# Patient Record
Sex: FEMALE | Race: WHITE | NOT HISPANIC OR LATINO | Employment: OTHER | URBAN - METROPOLITAN AREA
[De-identification: names, ages, dates, MRNs, and addresses within clinical notes are randomized per-mention and may not be internally consistent; named-entity substitution may affect disease eponyms.]

---

## 2023-05-31 LAB
ABO GROUP (TYPE) IN BLOOD: NORMAL
ABO GROUP (TYPE) IN BLOOD: NORMAL
ANTIBODY SCREEN: NORMAL
ANTIBODY SCREEN: NORMAL
ERYTHROCYTE DISTRIBUTION WIDTH (RATIO) BY AUTOMATED COUNT: 14.4 % (ref 11.5–14.5)
ERYTHROCYTE DISTRIBUTION WIDTH (RATIO) BY AUTOMATED COUNT: 14.4 % (ref 11.5–14.5)
ERYTHROCYTE MEAN CORPUSCULAR HEMOGLOBIN CONCENTRATION (G/DL) BY AUTOMATED: 32.2 G/DL (ref 32–36)
ERYTHROCYTE MEAN CORPUSCULAR HEMOGLOBIN CONCENTRATION (G/DL) BY AUTOMATED: 32.2 G/DL (ref 32–36)
ERYTHROCYTE MEAN CORPUSCULAR VOLUME (FL) BY AUTOMATED COUNT: 86 FL (ref 80–100)
ERYTHROCYTE MEAN CORPUSCULAR VOLUME (FL) BY AUTOMATED COUNT: 86 FL (ref 80–100)
ERYTHROCYTES (10*6/UL) IN BLOOD BY AUTOMATED COUNT: 4.94 X10E12/L (ref 4–5.2)
ERYTHROCYTES (10*6/UL) IN BLOOD BY AUTOMATED COUNT: 4.94 X10E12/L (ref 4–5.2)
HEMATOCRIT (%) IN BLOOD BY AUTOMATED COUNT: 42.5 % (ref 36–46)
HEMATOCRIT (%) IN BLOOD BY AUTOMATED COUNT: 42.5 % (ref 36–46)
HEMOGLOBIN (G/DL) IN BLOOD: 13.7 G/DL (ref 12–16)
HEMOGLOBIN (G/DL) IN BLOOD: 13.7 G/DL (ref 12–16)
LEUKOCYTES (10*3/UL) IN BLOOD BY AUTOMATED COUNT: 5.2 X10E9/L (ref 4.4–11.3)
LEUKOCYTES (10*3/UL) IN BLOOD BY AUTOMATED COUNT: 5.2 X10E9/L (ref 4.4–11.3)
PLATELETS (10*3/UL) IN BLOOD AUTOMATED COUNT: 263 X10E9/L (ref 150–450)
PLATELETS (10*3/UL) IN BLOOD AUTOMATED COUNT: 263 X10E9/L (ref 150–450)
RH FACTOR: NORMAL
RH FACTOR: NORMAL

## 2023-06-02 ENCOUNTER — HOSPITAL ENCOUNTER (OUTPATIENT)
Dept: DATA CONVERSION | Facility: HOSPITAL | Age: 38
End: 2023-06-02
Attending: STUDENT IN AN ORGANIZED HEALTH CARE EDUCATION/TRAINING PROGRAM | Admitting: STUDENT IN AN ORGANIZED HEALTH CARE EDUCATION/TRAINING PROGRAM
Payer: COMMERCIAL

## 2023-06-02 DIAGNOSIS — N93.9 ABNORMAL UTERINE AND VAGINAL BLEEDING, UNSPECIFIED: ICD-10-CM

## 2023-06-02 DIAGNOSIS — N84.0 POLYP OF CORPUS UTERI: ICD-10-CM

## 2023-06-02 DIAGNOSIS — Z88.2 ALLERGY STATUS TO SULFONAMIDES: ICD-10-CM

## 2023-06-10 LAB
COMPLETE PATHOLOGY REPORT: NORMAL
CONVERTED CLINICAL DIAGNOSIS-HISTORY: NORMAL
CONVERTED FINAL DIAGNOSIS: NORMAL
CONVERTED FINAL REPORT PDF LINK TO COPY AND PASTE: NORMAL
CONVERTED GROSS DESCRIPTION: NORMAL
URINE CULTURE: NORMAL

## 2023-09-07 VITALS — WEIGHT: 136.91 LBS | HEIGHT: 67 IN | BODY MASS INDEX: 21.49 KG/M2

## 2023-09-30 NOTE — H&P
History of Present Illness:   Pregnant/Lactating:  ·  Are You Pregnant no   ·  Are You Currently Breastfeeding no     History Present Illness:  Reason for surgery: 37yo P1 with AUB presents for  hysteroscopy, d&c, possible polypectomy, and LNG IUD placement   HPI:    37yo P1 with AUB presents for hysteroscopy, d&c, possible polypectomy, and LNG IUD placement    : Hgb 13.7, Plt 263    Always had irregular menses. Long in induration, 7-11 days. Has bleeding every month that she believes is her period. 10-11 maxi pad, 100% saturated. Has intermenstrual bleeding. For spotting using super tampon every 4-6 hours. Uses double protection.  Painful, especially since delivery. Associated N/V. Hot baths and ibuprofen aids, but does not relieve the pain. No painful intercourse.     Did have a hysteroscopy, D&C and was told that she had polyp ~ 2 years ago at . No interest in future fertility.     TVUS  The uterus measures 10.2 x 4.2 x 5.8 cm. Intramural leiomyoma in the  posterior fundus measures 1.9 cm. There is a subserosal leiomyoma in  the posterior body-fundus junction measuring 2.2 cm.    OBHx: , CS x1   GynHx: Menarche at age 15, as above; last pap 2019 wnl per pt, remote h/o abnormal pap  MedHx: none  SurgHx: laparoscopic right ovarian cystectomy (size of grapefruit) c/b staph infection and hospitalization for 1 month, D&C, CS  Meds: [place]  All: cipro, sulfa, doxy (N/V, hives), shellfish  Social: social alcohol use, vapes; single mom  Family: reviewed, noncontributory          Allergies:        Allergies:  ·  Cipro : Hives/Urticaria  ·  doxycycline : Hives/Urticaria  ·  Bactrim : Unknown    Home Medication Review:   Home Medications Reviewed: yes     Impression/Procedure:   ·  Impression and Planned Procedure: 37yo P1 with AUB presents for hysteroscopy, d&c, possible polypectomy, and LNG IUD placement       ERAS (Enhanced Recovery After Surgery):  ·  ERAS Patient: yes   ·  CPM/PAT Utilization: no   ·   Immunonutrition Recovery Drink Utilization: no   ·  Carbohydrate Supplement Drink Utilization: no     Review of Systems:   Review of Systems:  All Other Systems: All other systems reviewed and  are negative       Physical Exam by System:    Constitutional: NAD   Eyes: EOMI   ENMT: mucous membranes moist   Head/Neck: NCAT   Respiratory/Thorax: normal work of breathing   Cardiovascular: warm and well perfused   Gastrointestinal: abdomen soft   Musculoskeletal: normal ROM   Extremities: no edema appreciated   Neurological: no gross deficits   Psychological: normal mood and affect   Skin: Warm and dry     Consent:   COVID-19 Consent:  ·  COVID-19 Risk Consent Surgeon has reviewed key risks related to the risk of liza COVID-19 and if they contract COVID-19 what the risks are.     Attestation:   Note Completion:  I am a:  Resident/Fellow   Attending Attestation I saw and evaluated the patient.  I personally obtained the key and critical portions of the history and physical exam or was physically present for key and  critical portions performed by the resident/fellow. I reviewed the resident/fellow?s documentation and discussed the patient with the resident/fellow.  I agree with the resident/fellow?s medical decision making as documented in the note.     I personally evaluated the patient on 02-Jun-2023         Electronic Signatures:  Monica Romero)  (Signed 02-Jun-2023 12:19)   Authored: Note Completion   Co-Signer: History of Present Illness, Allergies, Home Medication Review, Impression/Procedure, ERAS, Review of Systems, Physical Exam,  Consent, Note Completion  Oneyda Sher (Resident))  (Signed 01-Jun-2023 16:40)   Authored: History of Present Illness, Allergies, Home  Medication Review, Impression/Procedure, ERAS, Review of Systems, Physical Exam, Consent, Note Completion      Last Updated: 02-Jun-2023 12:19 by Monica Romero)

## 2023-10-02 NOTE — OP NOTE
Post Operative Note:     PreOp Diagnosis: AUB   Post-Procedure Diagnosis: AUB, endometrial polyp   Procedure: 1. Hysteroscopy with resection of endometrial  polyp and tissue  2. IUD placement   Surgeon: Nick   Resident/Fellow/Other Assistant: Alvin Sher   Anesthesia: General LMA   I.V. Fluids: 300cc crystalloid   Estimated Blood Loss (mL): 5   Blood Replacement: none   Specimen: yes   Complications: none   Findings: Tortuous but normal appearing cervical  canal; endometrial cavity with thickening and fluffy endometrial tissue in posterior R wall and small endometrial polyp in lower L side   Patient Returned To/Condition: PACU, stable condition   Urine Output: 300   Drains and/or Catheters: none   Tourniquet Times: n/a   Implants: none     Operative Report Dictated:  Dictation: not applicable - note contains Operative  Report   Operative Report:    After informed consent was obtained, the patient was taken to the operating room where anesthesia was administered. She was then placed in the dorsal lithotomy position.  An exam under anesthesia revealed an anteverted uterus. She was then prepped and draped in the usual sterile fashion. Speculum was inserted to visualize the cervix. A single tooth tenaculum was used to grasp the anterior lip of the cervix. Iraheta dilators  were used to dilate up to a 17 Latvian. Hysteroscope was introduced atraumatically into cervix and then uterus. The endocervix was evaluated for lesions, tubal ostia were then identified, and the uterine cavity was then evaluated for lesions. The cervical  canal was noted to be tortuous but normal appearing. The endometrial cavity was noted to have thickened and fluffy endometrial tissue on the posterior right wall and a small endometrial polyp on lower left side. An aveta smol resector was used to resect  these abnormal areas. Global sampling was also obtained. The scope and resector were then removed. Fluid deficit was noted to be 230 at the  end of the procedure.    Following hysteroscopy, a mirena IUD was placed in the usual fashion. Then the tenaculum was removed and site was hemostatic with minimal pressure.  All counts were correct, the patient tolerated the procedure well.  Dr. Romero was present for the entire  procedure. The patient was taken to PACU in stable condition.    Attestation:   Note Completion:  I am a: Resident/Fellow   Attending Attestation I was present for the entire procedure          Electronic Signatures:  Monica Romero (MD)  (Signed 04-Jun-2023 20:07)   Authored: Note Completion   Co-Signer: Post Operative Note, Note Completion  Oneyda Sher (Resident))  (Signed 02-Jun-2023 15:59)   Authored: Post Operative Note, Note Completion      Last Updated: 04-Jun-2023 20:07 by Monica Romero)

## 2024-01-17 ENCOUNTER — TELEPHONE (OUTPATIENT)
Dept: OBSTETRICS AND GYNECOLOGY | Facility: CLINIC | Age: 39
End: 2024-01-17
Payer: COMMERCIAL

## 2024-01-17 NOTE — TELEPHONE ENCOUNTER
Patient would like to see Dr. Romero to possibly get IUD out and discuss going back on medication. Patient scheduled for 2/6/2024

## 2024-02-06 ENCOUNTER — OFFICE VISIT (OUTPATIENT)
Dept: OBSTETRICS AND GYNECOLOGY | Facility: CLINIC | Age: 39
End: 2024-02-06
Payer: COMMERCIAL

## 2024-02-06 VITALS
SYSTOLIC BLOOD PRESSURE: 122 MMHG | DIASTOLIC BLOOD PRESSURE: 79 MMHG | BODY MASS INDEX: 22.35 KG/M2 | HEART RATE: 100 BPM | WEIGHT: 142.4 LBS | HEIGHT: 67 IN

## 2024-02-06 DIAGNOSIS — N94.89 MENSTRUAL SUPPRESSION: Primary | ICD-10-CM

## 2024-02-06 DIAGNOSIS — R10.84 GENERALIZED ABDOMINAL PAIN: ICD-10-CM

## 2024-02-06 PROCEDURE — 99214 OFFICE O/P EST MOD 30 MIN: CPT | Performed by: STUDENT IN AN ORGANIZED HEALTH CARE EDUCATION/TRAINING PROGRAM

## 2024-02-06 ASSESSMENT — ENCOUNTER SYMPTOMS
GASTROINTESTINAL NEGATIVE: 1
CARDIOVASCULAR NEGATIVE: 0
PSYCHIATRIC NEGATIVE: 0
HEMATOLOGIC/LYMPHATIC NEGATIVE: 0
RESPIRATORY NEGATIVE: 0
CONSTITUTIONAL NEGATIVE: 0
NEUROLOGICAL NEGATIVE: 0
EYES NEGATIVE: 0
ALLERGIC/IMMUNOLOGIC NEGATIVE: 0
MUSCULOSKELETAL NEGATIVE: 0
ENDOCRINE NEGATIVE: 0

## 2024-02-06 ASSESSMENT — PAIN SCALES - GENERAL: PAINLEVEL: 1

## 2024-02-06 NOTE — PROGRESS NOTES
"Division of Minimally Invasive Gynecologic Surgery  [facility]    02/06/24 Gynecology Visit    CC: Follow up AUB    Lucero Snyder is a 38 y.o. w/ Mirena IUD in place (placed 6/2023) presents to discuss persistent AUB on Mirena IUD    Underwent hysteroscopic polypectomy/endometrial sampling/IUD placement on 6/2023.     Prior to procedure, she was having significant abdominal pain. She has since moved to a plant-based diet and this issue has greatly improved.     Her bleeding however has worsened. She is now having menstrual cycles that last up to 3 weeks per month with heavy bleeding. Denies symptoms of anemia.     She would like to have Mirena removed and switch to alternate method.     PMHx, PSHx, SHx, Allergies, and Medications updated in Epic.    ROS: reviewed and negative    PE: /79   Pulse 100   Ht 1.702 m (5' 7\")   Wt 64.6 kg (142 lb 6.4 oz)   LMP 01/17/2024 (Exact Date)   BMI 22.30 kg/m²     Constitutional:  No acute distress, well-nourished and well-developed  HEENT: EOM grossly intact, MMM, neck supple and with full ROM  Pulm:  Effort normal. No accessory muscle usage.  No respiratory distress.  :  - EGBUS: grossly WNL  - Speculum: vaginal and cervical mucosa grossly WNL, no trauma or lesions, IUD strings visible  Neurological:  She is alert and oriented to person place and time.  Skin: Warm, no pallor.  Psychiatric:  She has normal mood and affect.    IUD Removal  A speculum was placed in the vagina. IUD strings were visible and were grasped with a ring forcep. Gentle traction was used to remove the IUD. IUD was noted to be intact and was shown to patient.     A/P: Lucero Snyder is a 38 y.o. w/ Mirena IUD in place (placed 6/2023) presents to discuss persistent AUB on Mirena IUD  - IUD removed w/o issue, patient tolerated procedure well.   - Discussed options, she is most interested in a trial of Slynd. Coupon given today.   - RTC in 3 months for med check  - " Considering more definitive management     Monica Romero MD  Division of Minimally Invasive Gynecologic Surgery  [facility]

## 2024-02-07 PROBLEM — N93.9 ABNORMAL UTERINE BLEEDING (AUB): Status: ACTIVE | Noted: 2024-02-07

## 2024-02-07 PROBLEM — R10.2 FEMALE PELVIC PAIN: Status: ACTIVE | Noted: 2024-02-07

## 2024-04-09 ENCOUNTER — OFFICE VISIT (OUTPATIENT)
Dept: GASTROENTEROLOGY | Facility: HOSPITAL | Age: 39
End: 2024-04-09
Payer: COMMERCIAL

## 2024-04-09 VITALS — HEIGHT: 67 IN | WEIGHT: 138 LBS | BODY MASS INDEX: 21.66 KG/M2

## 2024-04-09 DIAGNOSIS — R10.30 LOWER ABDOMINAL PAIN: ICD-10-CM

## 2024-04-09 DIAGNOSIS — R10.84 GENERALIZED ABDOMINAL PAIN: ICD-10-CM

## 2024-04-09 DIAGNOSIS — K59.00 CONSTIPATION, UNSPECIFIED CONSTIPATION TYPE: Primary | ICD-10-CM

## 2024-04-09 PROCEDURE — 99213 OFFICE O/P EST LOW 20 MIN: CPT

## 2024-04-09 PROCEDURE — 99203 OFFICE O/P NEW LOW 30 MIN: CPT

## 2024-04-09 RX ORDER — POLYETHYLENE GLYCOL 3350 17 G/17G
17 POWDER, FOR SOLUTION ORAL DAILY
Qty: 1700 G | Refills: 11 | Status: SHIPPED | OUTPATIENT
Start: 2024-04-09

## 2024-04-09 ASSESSMENT — ENCOUNTER SYMPTOMS
SHORTNESS OF BREATH: 0
NAUSEA: 0
ABDOMINAL PAIN: 1
APPETITE CHANGE: 0
BLOOD IN STOOL: 0
TROUBLE SWALLOWING: 0
DIARRHEA: 1
CHILLS: 0
FATIGUE: 0
CONSTIPATION: 1
COUGH: 0
FEVER: 0
VOMITING: 0
ABDOMINAL DISTENTION: 0
ANAL BLEEDING: 0
RECTAL PAIN: 0

## 2024-04-09 NOTE — PROGRESS NOTES
Subjective     History of Present Illness:   Lucero Snyder is a 38 y.o. female with PMHx of abnormal uterine bleeding who presents to GI clinic for further evaluation severe abdominal pain and irregular bowel movements    Today, for the past few years patient has had horrible menstrual cycles, had D&C for uterine fibroids, tried IUD- removed and now on oral contraceptives.  Has severe low abdominal and rectal pressure that has mainly been attributed to OB gyn issues.  Stopped eating meat in January, which stopped low abdominal pain, pressure.  Had lots of bloating, which has resolved. Moves bowels daily to every other day with liquid to hard stool and feelings of not emptying.  Low abdominal pain is now mild if she accidentally eats meat.  Denies constipation, diarrhea, dyspepsia, melena, hematochezia, dysphagia, unintentional weight loss    Denies ETOH, smoking, marijuana.  Daily vaping  Denies fxh GI cancer: maternal grandma stomach cancer.  Denies fhx IBD  Abdominal Surgeries: , ovarian cystectomy    Denies history of colonoscopy or EGD       Past Medical History  As per HPI.     Social History  she       Family History  her family history is not on file.     Review of Systems  Review of Systems   Constitutional:  Negative for appetite change, chills, fatigue and fever.   HENT:  Negative for trouble swallowing.    Respiratory:  Negative for cough and shortness of breath.    Gastrointestinal:  Positive for abdominal pain (low), constipation and diarrhea. Negative for abdominal distention, anal bleeding, blood in stool, nausea, rectal pain and vomiting.       Allergies  Allergies   Allergen Reactions    Shellfish Derived Anaphylaxis    Ciprofloxacin Hives and GI Upset    Doxycycline Hives and GI Upset    Sulfa (Sulfonamide Antibiotics) Hives and GI Upset       Medications  Current Outpatient Medications   Medication Instructions    drospirenone, contraceptive, 4 mg (28) tablet 1 tablet, oral, Daily     polyethylene glycol (MIRALAX) 17 g, oral, Daily, Mix 1 capful in 8 oz of liquid        Objective   There were no vitals taken for this visit.   Physical Exam  Constitutional:       Appearance: Normal appearance. She is normal weight.   HENT:      Mouth/Throat:      Mouth: Mucous membranes are dry.      Pharynx: Oropharynx is clear.   Cardiovascular:      Rate and Rhythm: Normal rate and regular rhythm.   Pulmonary:      Effort: Pulmonary effort is normal.      Breath sounds: Normal breath sounds. No wheezing or rhonchi.   Abdominal:      General: Abdomen is flat. Bowel sounds are normal. There is no distension.      Palpations: Abdomen is soft. There is no hepatomegaly.      Tenderness: There is abdominal tenderness (LLQ). There is no guarding or rebound. Negative signs include Barbour's sign.      Hernia: No hernia is present.   Musculoskeletal:         General: Normal range of motion.   Skin:     General: Skin is warm and dry.   Neurological:      General: No focal deficit present.      Mental Status: She is alert and oriented to person, place, and time.   Psychiatric:         Mood and Affect: Mood normal.         Behavior: Behavior normal.           Lab Results   Component Value Date    WBC 5.2 05/31/2023    WBC 5.2 05/31/2023    WBC 7.0 01/23/2023    HGB 13.7 05/31/2023    HGB 13.7 05/31/2023    HGB 12.5 01/23/2023    HCT 42.5 05/31/2023    HCT 42.5 05/31/2023    HCT 40.2 01/23/2023     05/31/2023     05/31/2023     01/23/2023     Lab Results   Component Value Date     01/30/2021     05/22/2019    K 3.4 (L) 01/30/2021    K 3.9 05/22/2019     01/30/2021     05/22/2019    CO2 24 01/30/2021    CO2 25 05/22/2019    BUN 11 01/30/2021    BUN 11 05/22/2019    CREATININE 0.77 01/30/2021    CREATININE 0.8 05/22/2019    CALCIUM 9.2 01/30/2021    CALCIUM 9.4 05/22/2019    PROT 7.9 01/30/2021    PROT 7.3 05/22/2019    BILITOT 0.5 01/30/2021    BILITOT 0.4 05/22/2019    ALKPHOS 43  01/30/2021    ALKPHOS 46 05/22/2019    ALT 13 01/30/2021    ALT 6 05/22/2019    AST 12 01/30/2021    AST 9 05/22/2019    GLUCOSE 82 01/30/2021    GLUCOSE 83 05/22/2019           Lucero Snyder is a 38 y.o. female who presents to GI clinic for low abdominal pain and constipation.    Constipation  Symptoms most suggestive of chronic constipation vs. IBS  - start daily miralax and daily fiber recommended  - continue to avoid triggering foods like meats  Follow up as needed         Mayda Boyer, APRN-CNP

## 2024-04-09 NOTE — ASSESSMENT & PLAN NOTE
Symptoms most suggestive of chronic constipation vs. IBS  - start daily miralax and daily fiber recommended  - continue to avoid triggering foods like meats  Follow up as needed

## 2024-04-09 NOTE — PATIENT INSTRUCTIONS
I recommend taking 1 capful of Miralax daily in 8 oz of liquid.  I recommend a B12 supplement if you are not eating meat.   Please take 1-2 spoonfuls daily of fiber daily.  Dissolve in 8 oz liquid or you may take fiber gummies- benefiber is less bloating    Things you may try: Increase water intake, lukewarm lemon water, tomasz and tumeric, encapsulated peppermint, look into a supplement called L-glutamine, dung, papaya or pineapple after meals, probiotics like kambucha, sauerkraut, sourdough bread, and chew food thoroughly    If this is not enough to empty you, please take 1-2 spoonfuls daily of fiber.  Dissolve in 8 oz liquid.    If not better in 3 weeks, contact me

## 2024-09-16 ENCOUNTER — OFFICE VISIT (OUTPATIENT)
Dept: PRIMARY CARE | Facility: CLINIC | Age: 39
End: 2024-09-16
Payer: COMMERCIAL

## 2024-09-16 VITALS
DIASTOLIC BLOOD PRESSURE: 70 MMHG | SYSTOLIC BLOOD PRESSURE: 126 MMHG | TEMPERATURE: 99 F | HEART RATE: 98 BPM | OXYGEN SATURATION: 99 % | WEIGHT: 132 LBS | BODY MASS INDEX: 20.72 KG/M2 | HEIGHT: 67 IN

## 2024-09-16 DIAGNOSIS — Z13.228 SCREENING FOR METABOLIC DISORDER: ICD-10-CM

## 2024-09-16 DIAGNOSIS — Z11.59 NEED FOR HEPATITIS C SCREENING TEST: ICD-10-CM

## 2024-09-16 DIAGNOSIS — Z00.00 ROUTINE ADULT HEALTH MAINTENANCE: Primary | ICD-10-CM

## 2024-09-16 DIAGNOSIS — F41.9 ANXIETY: ICD-10-CM

## 2024-09-16 DIAGNOSIS — Z13.0 SCREENING FOR DEFICIENCY ANEMIA: ICD-10-CM

## 2024-09-16 PROBLEM — N93.9 ABNORMAL UTERINE BLEEDING (AUB): Status: RESOLVED | Noted: 2024-02-07 | Resolved: 2024-09-16

## 2024-09-16 PROCEDURE — 99203 OFFICE O/P NEW LOW 30 MIN: CPT | Performed by: FAMILY MEDICINE

## 2024-09-16 PROCEDURE — 99385 PREV VISIT NEW AGE 18-39: CPT | Performed by: FAMILY MEDICINE

## 2024-09-16 PROCEDURE — 3008F BODY MASS INDEX DOCD: CPT | Performed by: FAMILY MEDICINE

## 2024-09-16 RX ORDER — ESCITALOPRAM OXALATE 5 MG/1
5 TABLET ORAL DAILY
Qty: 30 TABLET | Refills: 2 | Status: SHIPPED | OUTPATIENT
Start: 2024-09-16 | End: 2024-12-15

## 2024-09-16 ASSESSMENT — ANXIETY QUESTIONNAIRES
1. FEELING NERVOUS, ANXIOUS, OR ON EDGE: NEARLY EVERY DAY
GAD7 TOTAL SCORE: 18
3. WORRYING TOO MUCH ABOUT DIFFERENT THINGS: NEARLY EVERY DAY
6. BECOMING EASILY ANNOYED OR IRRITABLE: NEARLY EVERY DAY
4. TROUBLE RELAXING: NEARLY EVERY DAY
7. FEELING AFRAID AS IF SOMETHING AWFUL MIGHT HAPPEN: NEARLY EVERY DAY
2. NOT BEING ABLE TO STOP OR CONTROL WORRYING: NOT AT ALL
IF YOU CHECKED OFF ANY PROBLEMS ON THIS QUESTIONNAIRE, HOW DIFFICULT HAVE THESE PROBLEMS MADE IT FOR YOU TO DO YOUR WORK, TAKE CARE OF THINGS AT HOME, OR GET ALONG WITH OTHER PEOPLE: EXTREMELY DIFFICULT
5. BEING SO RESTLESS THAT IT IS HARD TO SIT STILL: NEARLY EVERY DAY

## 2024-09-16 ASSESSMENT — PATIENT HEALTH QUESTIONNAIRE - PHQ9
SUM OF ALL RESPONSES TO PHQ9 QUESTIONS 1 AND 2: 0
2. FEELING DOWN, DEPRESSED OR HOPELESS: NOT AT ALL
1. LITTLE INTEREST OR PLEASURE IN DOING THINGS: NOT AT ALL

## 2024-09-16 ASSESSMENT — PAIN SCALES - GENERAL: PAINLEVEL: 0-NO PAIN

## 2024-09-16 NOTE — PATIENT INSTRUCTIONS
Problem List Items Addressed This Visit             ICD-10-CM    Anxiety F41.9     - Will try low-dose of generic Lexapro  -Please contact office if any tolerance issues as we initiate trial  -Will attempt to focus on effectiveness after 3 to 4 weeks with plans to contact office with any updates  -If you continue to have ongoing sleep issues, can use as needed hydroxyzine which you do have access to at home.  If you find that this prescription is no longer accessible, please let me know and I can send a new prescription         Relevant Medications    escitalopram (Lexapro) 5 mg tablet     Other Visit Diagnoses         Codes    Routine adult health maintenance    -  Primary Z00.00    Relevant Orders    TSH with reflex to Free T4 if abnormal    Lipid Panel    Comprehensive Metabolic Panel    CBC    Hepatitis C antibody    Screening for deficiency anemia     Z13.0    Relevant Orders    CBC    Screening for metabolic disorder     Z13.228    Relevant Orders    TSH with reflex to Free T4 if abnormal    Lipid Panel    Comprehensive Metabolic Panel    Need for hepatitis C screening test     Z11.59    Relevant Orders    Hepatitis C antibody            Additional Visit Plans:  - Complete history and physical examination was performed    GENERAL RECOMMENDATIONS:  - Complete review of history of physical exam completed today  - A healthy diet to maintain a normal BMI (under 25) to reduce heart disease, risk for diabetes encouraged.  - Exercising 150 minutes per week and eating healthy to reduce heart disease.  - Blood pressure screen completed.    BLOOD TESTING:  - Orders for fasting routine blood work given today, to be completed at your earliest convenience  - Will contact you with the blood work results once received and reviewed.    General Recommendations include:  - Cholesterol and diabetes screen if risk factors (overweight, high blood pressure).  - Sexually transmitted infections if risk factors.  - Hepatitis C virus  Event Note screen for all adults-ordered with blood work today    VACCINATIONS RECOMMENDATIONS:  - Flu shot annually - advocated seasonally  - Tetanus booster every 10 years - up-to-date  - Pneumonia vaccination starting at 65 years old (or earlier if risk factors - smoker, diabetic, heart or lung conditions) -not due yet  - Shingles vaccine for those 50 years or older - check with your insurance for SHINGRIX coverage and get it at your local pharmacy -not due yet  -COVID-19 vaccine series completed with patient currently due for booster    SCREENINGS RECOMMENDATIONS:  -Colon cancer screening (with colonoscopy or Cologuard) for men and women starting at age 45 until 74 years old - not due yet    (female)  - Cervical cancer screening (pap test) in women starting at age 21 until age 65 years old -advocated to be completed with female   - Mammogram screening for breast cancer in women starting at 40-50 years and every 1-2 years until age 74 - not due yet  - Bone density screening (DEXA) for osteoporosis in women aged 65 years and older (in younger women who are higher risk) - not due yet    Counseling:       Medication education:         Education:  The patient is counseled regarding potential side-effects of all new medications        Understanding:  Patient expressed understanding        Adherence:  No barriers to adherence identified      ** Please excuse any errors in grammar or translation related to this dictation. Voice recognition software was utilized to prepare this document. **

## 2024-09-16 NOTE — PROGRESS NOTES
Outpatient Visit Note    Chief Complaint   Patient presents with    New Patient Visit    Annual Exam    Anxiety       HPI:  Lucero Snyder is a 39 y.o. female who presents to the office as a new patient to establish care and for annual well exam.  She additionally notes concerns regarding anxiety    Last panel blood work completed in 2023 via OB/GYN.    States it has been many years since she has been seen by primary care provider.  Does have a very extensive schedule working 2 jobs and taking care of of her daughter who has special needs.    Well Exam:  Overall, they describe their health as fair with no reports of recent illness or hospitalization. They state that their diet is up-and-down, dealing with significant dietary issues/IBS.  Notes that symptoms were worse with eating a which she has attempted to convert to a plant-based diet but this has been challenging to upkeep in her household. Denies issues of chest pain, shortness of breath, headaches, vision/hearing changes, abdominal pain, vomiting, diarrhea, melena, hematochezia, constipation or urinary symptoms.    Admits to prominent struggles with anxiety impacting her day-to-day activities.  Additionally notes prominent sleep disruption over the course of the last 2 months.  Has been getting only a few hours of interrupted sleep nightly which has further worsened her mood.  Does admit to actively using Benadryl to help sleep so this leaves her groggy.  Has had previous mood issues in the past reporting to have been placed on sertraline as a teenager to which she had an adverse fact, including mental fog/numbness.  Additionally notes trial of Abilify and Xanax, with additional history of postpartum depression.  Reports historically sensitivity to medications which makes her apprehensive of taking prescriptions.    Preventative Health Maintenance:  In regards to preventative health maintenance, last Tdap received in 2022. Flu shot due at this time.  COVID-19 vaccine series completed with patient currently due for booster.    She has an established relationship with OB/GYN who organizes her routine female health maintenance exams.    Current Medications  Current Outpatient Medications   Medication Instructions    drospirenone, contraceptive, 4 mg (28) tablet 1 tablet, oral, Daily    escitalopram (LEXAPRO) 5 mg, oral, Daily        Allergies  Allergies   Allergen Reactions    Shellfish Derived Anaphylaxis    Ciprofloxacin Hives and GI Upset    Doxycycline Hives and GI Upset    Sulfa (Sulfonamide Antibiotics) Hives and GI Upset        Immunizations  Immunization History   Administered Date(s) Administered    Flu vaccine (IIV4), preservative free *Check age/dose* 10/27/2018, 10/12/2019, 2020, 10/31/2021    MMR vaccine, subcutaneous (MMR II) 2022    Pfizer Purple Cap SARS-CoV-2 2021, 04/10/2021    Tdap vaccine, age 7 year and older (BOOSTRIX, ADACEL) 10/07/2015, 2022        Past Medical History:   Diagnosis Date    Anxiety     Varicella without complication     Varicella without complication      Past Surgical History:   Procedure Laterality Date     SECTION, LOW TRANSVERSE  12/10/2010    OTHER SURGICAL HISTORY  2015    Ovarian Cystectomy    OTHER SURGICAL HISTORY  2018     section     Family History   Problem Relation Name Age of Onset    Anesthesia related problems Mother Yuni     Miscarriages / Stillbirths Mother Yuni     Alcohol abuse Father Father     Mental illness Father Father     Anesthesia related problems Daughter Dona     Mental illness Mother's Sister Aunt     Mental illness Brother Brother      Social History     Tobacco Use    Smoking status: Never    Smokeless tobacco: Current   Vaping Use    Vaping status: Never Used   Substance Use Topics    Alcohol use: Not Currently    Drug use: Never       ROS  All pertinent positive symptoms are included in the history of present illness.  All other  systems have been reviewed and are negative and noncontributory to this patient's current ailments.    VITAL SIGNS  Vitals:    09/16/24 1607   BP: 126/70   Pulse: 98   Temp: 37.2 °C (99 °F)   SpO2: 99%       PHYSICAL EXAM  GENERAL APPEARANCE: alert and oriented, Pleasant and cooperative, No Acute Distress.   HEENT: EOMI, PERRLA, TMs intact and flat bilaterally, patent nares, normal oropharynx, MMM  NECK: no lymphadenopathy, no thyromegaly.   HEART: RRR, normal S1S2, no murmurs, click or rubs.   LUNGS: clear to auscultation bilaterally, no wheezes/rhonchi/rales.   ABDOMEN: soft, non-tender, no organomegaly, no masses palpated, no guarding or rigidity.   EXTREMITIES: no edema, normal ROM  SKIN: normal, no rash, unremarkable.   NEUROLOGIC EXAM: non-focal exam.   MUSCULOSKELETAL: no gross abnormalities.   PSYCH: affect is normal, eye contact is good.     Assessment/Plan   Problem List Items Addressed This Visit             ICD-10-CM    Anxiety F41.9     - Will try low-dose of generic Lexapro  -Please contact office if any tolerance issues as we initiate trial  -Will attempt to focus on effectiveness after 3 to 4 weeks with plans to contact office with any updates  -If you continue to have ongoing sleep issues, can use as needed hydroxyzine which you do have access to at home.  If you find that this prescription is no longer accessible, please let me know and I can send a new prescription         Relevant Medications    escitalopram (Lexapro) 5 mg tablet     Other Visit Diagnoses         Codes    Routine adult health maintenance    -  Primary Z00.00    Relevant Orders    TSH with reflex to Free T4 if abnormal    Lipid Panel    Comprehensive Metabolic Panel    CBC    Hepatitis C antibody    Screening for deficiency anemia     Z13.0    Relevant Orders    CBC    Screening for metabolic disorder     Z13.228    Relevant Orders    TSH with reflex to Free T4 if abnormal    Lipid Panel    Comprehensive Metabolic Panel    Need  for hepatitis C screening test     Z11.59    Relevant Orders    Hepatitis C antibody            Additional Visit Plans:  - Complete history and physical examination was performed    GENERAL RECOMMENDATIONS:  - Complete review of history of physical exam completed today  - A healthy diet to maintain a normal BMI (under 25) to reduce heart disease, risk for diabetes encouraged.  - Exercising 150 minutes per week and eating healthy to reduce heart disease.  - Blood pressure screen completed.    BLOOD TESTING:  - Orders for fasting routine blood work given today, to be completed at your earliest convenience  - Will contact you with the blood work results once received and reviewed.    General Recommendations include:  - Cholesterol and diabetes screen if risk factors (overweight, high blood pressure).  - Sexually transmitted infections if risk factors.  - Hepatitis C virus screen for all adults-ordered with blood work today    VACCINATIONS RECOMMENDATIONS:  - Flu shot annually - advocated seasonally  - Tetanus booster every 10 years - up-to-date  - Pneumonia vaccination starting at 65 years old (or earlier if risk factors - smoker, diabetic, heart or lung conditions) -not due yet  - Shingles vaccine for those 50 years or older - check with your insurance for SHINGRIX coverage and get it at your local pharmacy -not due yet  -COVID-19 vaccine series completed with patient currently due for booster    SCREENINGS RECOMMENDATIONS:  -Colon cancer screening (with colonoscopy or Cologuard) for men and women starting at age 45 until 74 years old - not due yet    (female)  - Cervical cancer screening (pap test) in women starting at age 21 until age 65 years old -advocated to be completed with female   - Mammogram screening for breast cancer in women starting at 40-50 years and every 1-2 years until age 74 - not due yet  - Bone density screening (DEXA) for osteoporosis in women aged 65 years and older (in  younger women who are higher risk) - not due yet    Counseling:       Medication education:         Education:  The patient is counseled regarding potential side-effects of all new medications        Understanding:  Patient expressed understanding        Adherence:  No barriers to adherence identified      ** Please excuse any errors in grammar or translation related to this dictation. Voice recognition software was utilized to prepare this document. **

## 2024-09-16 NOTE — ASSESSMENT & PLAN NOTE
- Will try low-dose of generic Lexapro  -Please contact office if any tolerance issues as we initiate trial  -Will attempt to focus on effectiveness after 3 to 4 weeks with plans to contact office with any updates  -If you continue to have ongoing sleep issues, can use as needed hydroxyzine which you do have access to at home.  If you find that this prescription is no longer accessible, please let me know and I can send a new prescription

## 2024-10-02 ENCOUNTER — LAB (OUTPATIENT)
Dept: LAB | Facility: LAB | Age: 39
End: 2024-10-02
Payer: COMMERCIAL

## 2024-10-02 DIAGNOSIS — Z00.00 ROUTINE ADULT HEALTH MAINTENANCE: ICD-10-CM

## 2024-10-02 DIAGNOSIS — Z13.228 SCREENING FOR METABOLIC DISORDER: ICD-10-CM

## 2024-10-02 DIAGNOSIS — Z11.59 NEED FOR HEPATITIS C SCREENING TEST: ICD-10-CM

## 2024-10-02 DIAGNOSIS — Z13.0 SCREENING FOR DEFICIENCY ANEMIA: ICD-10-CM

## 2024-10-02 LAB
ALBUMIN SERPL BCP-MCNC: 4.4 G/DL (ref 3.4–5)
ALP SERPL-CCNC: 47 U/L (ref 33–110)
ALT SERPL W P-5'-P-CCNC: 6 U/L (ref 7–45)
ANION GAP SERPL CALC-SCNC: 13 MMOL/L (ref 10–20)
AST SERPL W P-5'-P-CCNC: 9 U/L (ref 9–39)
BILIRUB SERPL-MCNC: 0.5 MG/DL (ref 0–1.2)
BUN SERPL-MCNC: 12 MG/DL (ref 6–23)
CALCIUM SERPL-MCNC: 9.8 MG/DL (ref 8.6–10.3)
CHLORIDE SERPL-SCNC: 105 MMOL/L (ref 98–107)
CHOLEST SERPL-MCNC: 180 MG/DL (ref 0–199)
CHOLESTEROL/HDL RATIO: 3.5
CO2 SERPL-SCNC: 27 MMOL/L (ref 21–32)
CREAT SERPL-MCNC: 0.7 MG/DL (ref 0.5–1.05)
EGFRCR SERPLBLD CKD-EPI 2021: >90 ML/MIN/1.73M*2
ERYTHROCYTE [DISTWIDTH] IN BLOOD BY AUTOMATED COUNT: 12.8 % (ref 11.5–14.5)
GLUCOSE SERPL-MCNC: 88 MG/DL (ref 74–99)
HCT VFR BLD AUTO: 43.7 % (ref 36–46)
HCV AB SER QL: NONREACTIVE
HDLC SERPL-MCNC: 52 MG/DL
HGB BLD-MCNC: 13.9 G/DL (ref 12–16)
LDLC SERPL CALC-MCNC: 110 MG/DL
MCH RBC QN AUTO: 28.4 PG (ref 26–34)
MCHC RBC AUTO-ENTMCNC: 31.8 G/DL (ref 32–36)
MCV RBC AUTO: 89 FL (ref 80–100)
NON HDL CHOLESTEROL: 128 MG/DL (ref 0–149)
NRBC BLD-RTO: 0 /100 WBCS (ref 0–0)
PLATELET # BLD AUTO: 243 X10*3/UL (ref 150–450)
POTASSIUM SERPL-SCNC: 4.1 MMOL/L (ref 3.5–5.3)
PROT SERPL-MCNC: 7.1 G/DL (ref 6.4–8.2)
RBC # BLD AUTO: 4.89 X10*6/UL (ref 4–5.2)
SODIUM SERPL-SCNC: 141 MMOL/L (ref 136–145)
TRIGL SERPL-MCNC: 90 MG/DL (ref 0–149)
TSH SERPL-ACNC: 2.13 MIU/L (ref 0.44–3.98)
VLDL: 18 MG/DL (ref 0–40)
WBC # BLD AUTO: 5.2 X10*3/UL (ref 4.4–11.3)

## 2024-10-02 PROCEDURE — 85027 COMPLETE CBC AUTOMATED: CPT

## 2024-10-02 PROCEDURE — 80053 COMPREHEN METABOLIC PANEL: CPT

## 2024-10-02 PROCEDURE — 86803 HEPATITIS C AB TEST: CPT

## 2024-10-02 PROCEDURE — 80061 LIPID PANEL: CPT

## 2024-10-02 PROCEDURE — 36415 COLL VENOUS BLD VENIPUNCTURE: CPT

## 2024-10-02 PROCEDURE — 84443 ASSAY THYROID STIM HORMONE: CPT

## 2024-10-04 ENCOUNTER — E-VISIT (OUTPATIENT)
Dept: PRIMARY CARE | Facility: CLINIC | Age: 39
End: 2024-10-04
Payer: COMMERCIAL

## 2024-10-04 DIAGNOSIS — F41.9 ANXIETY: ICD-10-CM

## 2024-10-04 DIAGNOSIS — K59.00 CONSTIPATION, UNSPECIFIED CONSTIPATION TYPE: Primary | ICD-10-CM

## 2024-10-07 RX ORDER — ESCITALOPRAM OXALATE 10 MG/1
10 TABLET ORAL DAILY
Qty: 90 TABLET | Refills: 0 | Status: SHIPPED | OUTPATIENT
Start: 2024-10-07 | End: 2025-01-05

## 2024-11-26 ENCOUNTER — E-VISIT (OUTPATIENT)
Dept: PRIMARY CARE | Facility: CLINIC | Age: 39
End: 2024-11-26
Payer: COMMERCIAL

## 2024-11-26 DIAGNOSIS — F41.9 ANXIETY: ICD-10-CM

## 2024-11-26 RX ORDER — ESCITALOPRAM OXALATE 10 MG/1
10 TABLET ORAL DAILY
Qty: 90 TABLET | Refills: 0 | Status: SHIPPED | OUTPATIENT
Start: 2024-11-26 | End: 2025-02-24

## 2025-01-08 DIAGNOSIS — N94.89 MENSTRUAL SUPPRESSION: ICD-10-CM

## 2025-01-08 RX ORDER — DROSPIRENONE 4 MG/1
1 TABLET, FILM COATED ORAL DAILY
Qty: 84 TABLET | Refills: 0 | Status: SHIPPED | OUTPATIENT
Start: 2025-01-08

## 2025-02-10 DIAGNOSIS — N94.89 MENSTRUAL SUPPRESSION: ICD-10-CM

## 2025-02-10 RX ORDER — DROSPIRENONE 4 MG/1
1 TABLET, FILM COATED ORAL DAILY
Qty: 84 TABLET | Refills: 0 | Status: SHIPPED | OUTPATIENT
Start: 2025-02-10

## 2025-05-11 PROBLEM — R10.2 FEMALE PELVIC PAIN: Status: RESOLVED | Noted: 2024-02-07 | Resolved: 2025-05-11

## 2025-05-11 PROBLEM — R10.84 GENERALIZED ABDOMINAL PAIN: Status: RESOLVED | Noted: 2024-04-09 | Resolved: 2025-05-11

## 2025-05-11 PROBLEM — R10.30 LOWER ABDOMINAL PAIN: Status: RESOLVED | Noted: 2024-04-09 | Resolved: 2025-05-11

## 2025-05-11 PROBLEM — K59.00 CONSTIPATION: Status: RESOLVED | Noted: 2024-04-09 | Resolved: 2025-05-11

## 2025-05-14 ENCOUNTER — OFFICE VISIT (OUTPATIENT)
Dept: PRIMARY CARE | Facility: CLINIC | Age: 40
End: 2025-05-14
Payer: COMMERCIAL

## 2025-05-14 VITALS
OXYGEN SATURATION: 97 % | SYSTOLIC BLOOD PRESSURE: 120 MMHG | DIASTOLIC BLOOD PRESSURE: 72 MMHG | HEART RATE: 89 BPM | BODY MASS INDEX: 23.49 KG/M2 | WEIGHT: 150 LBS | TEMPERATURE: 99.9 F

## 2025-05-14 DIAGNOSIS — Z91.018 ALLERGY TO MEAT: ICD-10-CM

## 2025-05-14 DIAGNOSIS — Z86.2 HISTORY OF ANEMIA: ICD-10-CM

## 2025-05-14 DIAGNOSIS — G47.9 SLEEP DISTURBANCE: ICD-10-CM

## 2025-05-14 DIAGNOSIS — F41.9 ANXIETY: Primary | ICD-10-CM

## 2025-05-14 PROCEDURE — 99214 OFFICE O/P EST MOD 30 MIN: CPT | Performed by: FAMILY MEDICINE

## 2025-05-14 RX ORDER — FLUOXETINE 10 MG/1
10 CAPSULE ORAL DAILY
Qty: 30 CAPSULE | Refills: 1 | Status: SHIPPED | OUTPATIENT
Start: 2025-05-14 | End: 2025-07-13

## 2025-05-14 ASSESSMENT — PAIN SCALES - GENERAL: PAINLEVEL_OUTOF10: 0-NO PAIN

## 2025-05-14 ASSESSMENT — PATIENT HEALTH QUESTIONNAIRE - PHQ9
1. LITTLE INTEREST OR PLEASURE IN DOING THINGS: NOT AT ALL
2. FEELING DOWN, DEPRESSED OR HOPELESS: NOT AT ALL
SUM OF ALL RESPONSES TO PHQ9 QUESTIONS 1 & 2: 0

## 2025-05-14 ASSESSMENT — LIFESTYLE VARIABLES
HOW MANY STANDARD DRINKS CONTAINING ALCOHOL DO YOU HAVE ON A TYPICAL DAY: PATIENT DOES NOT DRINK
HOW OFTEN DO YOU HAVE A DRINK CONTAINING ALCOHOL: NEVER
AUDIT-C TOTAL SCORE: 0
HOW OFTEN DO YOU HAVE SIX OR MORE DRINKS ON ONE OCCASION: NEVER
SKIP TO QUESTIONS 9-10: 1

## 2025-05-14 ASSESSMENT — ENCOUNTER SYMPTOMS
LOSS OF SENSATION IN FEET: 0
DEPRESSION: 0
OCCASIONAL FEELINGS OF UNSTEADINESS: 0

## 2025-05-14 NOTE — PROGRESS NOTES
Subjective   Patient ID: Lucero Snyder is a 39 y.o. female who presents for New Patient Visit.    Here to establish.  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  Works at [employer].   from  - he lives in [place].  Lives with boyfriend - he has 3 older kids.      Anxiety  -F/U: Pt has been on lexapro - started on 5 mg.  Increased to 10 mg.  Not sleeping.  Still having anxiety.  Hx of post partum. Lifelong history of anxiety.  Worsened last year - on edge frequently.  Trouble going to grocery store.  Daughter has tourettes - daughter is doing well.  Pt has been on edge, irritable.     -Symptoms have been not improved  -She denies current suicidal and homicidal ideation.    -Current Treatment: Lexapro  -Counseling:  never  -Previous treatment includes:        Zoloft        Xanax     Gastrointestinal issues  -Pt states she cannot eat red meat.  Patient was having stomach pains - had D&C - no improvement.  Patient went plant based - symptoms improved.  Reintroduced chicken, turkey - no issues.  Red meat - within 24 hours - feels pressure, constipation.  Pt does have issue with tick bite.  Saw GI in the past - no scope.             Review of Systems    Objective   Wt 68 kg (150 lb)   BMI 23.49 kg/m²     Physical Exam  Vitals reviewed.   Constitutional:       General: She is not in acute distress.  Cardiovascular:      Rate and Rhythm: Normal rate and regular rhythm.   Pulmonary:      Effort: Pulmonary effort is normal.      Breath sounds: No wheezing or rhonchi.   Musculoskeletal:      Right lower leg: No edema.      Left lower leg: No edema.   Lymphadenopathy:      Cervical: No cervical adenopathy.   Neurological:      Mental Status: She is alert.         Assessment/Plan   Diagnoses and all orders for this visit:  Anxiety  Allergy to meat  Sleep disturbance  History of anemia    Patient Instructions   Here to establish.      Patient has history of anxiety.  Discussed options - recommend changing lexapro to  fluoxetine 10mg once a day.  If sleep is not improved we will discuss adding hydroxyzine or buspar.      For beef allergy - recommend checking IgG and Alpha Gal (tick mediated).      Recheck in 4 weeks via telehealth.

## 2025-05-14 NOTE — PATIENT INSTRUCTIONS
Here to establish.      Patient has history of anxiety.  Discussed options - recommend changing lexapro to fluoxetine 10mg once a day.  If sleep is not improved we will discuss adding hydroxyzine or buspar.      For beef allergy - recommend checking IgG and Alpha Gal (tick mediated).      Recheck in 4 weeks via telehealth.

## 2025-05-27 ENCOUNTER — TELEPHONE (OUTPATIENT)
Dept: PRIMARY CARE | Facility: CLINIC | Age: 40
End: 2025-05-27
Payer: COMMERCIAL

## 2025-05-27 NOTE — TELEPHONE ENCOUNTER
Pt is calling to say her insurance will not cover until she meets her high deductible. So she wants to wait unless you really think needs these tests, please advise and call [phone] (Mobile

## 2025-06-13 ENCOUNTER — TELEMEDICINE (OUTPATIENT)
Dept: PRIMARY CARE | Facility: CLINIC | Age: 40
End: 2025-06-13
Payer: COMMERCIAL

## 2025-06-13 DIAGNOSIS — F41.9 ANXIETY: ICD-10-CM

## 2025-06-13 RX ORDER — FLUOXETINE 10 MG/1
10 CAPSULE ORAL DAILY
Qty: 90 CAPSULE | Refills: 1 | Status: SHIPPED | OUTPATIENT
Start: 2025-06-13 | End: 2025-08-12

## 2025-06-13 RX ORDER — HYDROXYZINE HYDROCHLORIDE 10 MG/1
10-20 TABLET, FILM COATED ORAL NIGHTLY
Qty: 60 TABLET | Refills: 0 | Status: SHIPPED | OUTPATIENT
Start: 2025-06-13 | End: 2025-07-13

## 2025-06-13 NOTE — PROGRESS NOTES
Subjective   Patient ID: Lucero Snyder is a 40 y.o. female who presents for No chief complaint on file..    Virtual or Telephone Consent    An interactive audio and video telecommunication system which permits real time communications between the patient (at the originating site) and provider (at the distant site) was utilized to provide this telehealth service.   Verbal consent was requested and obtained from Lucero Snyder on this date, 06/13/25 for a telehealth visit and the patient's location was confirmed at the time of the visit.      Anxiety  -F/U: Doing better on prozac.  Tolerating well.  Mood is improved.  Not staying alseep.  Waking up in middle of night.  Benadryl- helps.    -Symptoms have been not improved  -She denies current suicidal and homicidal ideation.    -Current Treatment: Prozac.    -Counseling:  never  -Previous treatment includes:        Zoloft        Xanax        Lexapro              Review of Systems    Objective   There were no vitals taken for this visit.    Physical Exam  Neurological:      Mental Status: She is alert.         Assessment/Plan   Diagnoses and all orders for this visit:  Anxiety  -     hydrOXYzine HCL (Atarax) 10 mg tablet; Take 1-2 tablets (10-20 mg) by mouth once daily at bedtime.  -     FLUoxetine (PROzac) 10 mg capsule; Take 1 capsule (10 mg) by mouth once daily.     Depression improved on prozac - still not sleeping.  Discussed hydroxyzine and trazodone.  Trial of hydroxyzine first.  If not improved we will change.  Recheck in 4-6 weeks.

## 2025-07-08 DIAGNOSIS — F41.9 ANXIETY: ICD-10-CM

## 2025-07-08 RX ORDER — HYDROXYZINE HYDROCHLORIDE 10 MG/1
10-20 TABLET, FILM COATED ORAL NIGHTLY
Qty: 60 TABLET | Refills: 0 | Status: SHIPPED | OUTPATIENT
Start: 2025-07-08

## 2025-07-14 ENCOUNTER — TELEPHONE (OUTPATIENT)
Dept: PRIMARY CARE | Facility: CLINIC | Age: 40
End: 2025-07-14
Payer: COMMERCIAL

## 2025-07-14 NOTE — TELEPHONE ENCOUNTER
Vivien is calling to see if you will see her daughter as a NPT, her pcp Dr. Wasserman and he is leaving 8/31/25. She's aware he not taking on new pts and is booked way out, however she is wanting me to ask, her /child Dona Snyder 12/10/2010, having some back discomfort and keeps falling down, she had images done in 2022. Please advise and call back

## 2025-07-16 NOTE — TELEPHONE ENCOUNTER
I called and spoke with Lucero. Her Daugter Dona was scheduled on 08/07/25 as a Primary new C . This was the soonest the PT could come in. Mother states her Grandmother will be bringing her to the appointment because Mom will be  starting a new job then.    Lucero's # [phone]

## 2025-08-11 ENCOUNTER — APPOINTMENT (OUTPATIENT)
Dept: PRIMARY CARE | Facility: CLINIC | Age: 40
End: 2025-08-11
Payer: COMMERCIAL